# Patient Record
Sex: MALE | Race: WHITE
[De-identification: names, ages, dates, MRNs, and addresses within clinical notes are randomized per-mention and may not be internally consistent; named-entity substitution may affect disease eponyms.]

---

## 2021-04-02 VITALS — SYSTOLIC BLOOD PRESSURE: 119 MMHG | DIASTOLIC BLOOD PRESSURE: 60 MMHG

## 2021-11-01 ENCOUNTER — HOSPITAL ENCOUNTER (EMERGENCY)
Dept: HOSPITAL 61 - ER | Age: 81
Discharge: LEFT BEFORE DISCHARGE | End: 2021-11-01
Payer: MEDICARE

## 2021-11-01 DIAGNOSIS — R53.1: Primary | ICD-10-CM

## 2021-11-01 DIAGNOSIS — Z53.21: ICD-10-CM

## 2021-11-01 DIAGNOSIS — R06.02: ICD-10-CM

## 2021-11-17 VITALS — SYSTOLIC BLOOD PRESSURE: 110 MMHG | DIASTOLIC BLOOD PRESSURE: 63 MMHG

## 2022-01-28 ENCOUNTER — HOSPITAL ENCOUNTER (OUTPATIENT)
Dept: HOSPITAL 61 - NM | Age: 82
End: 2022-01-28
Attending: INTERNAL MEDICINE
Payer: MEDICARE

## 2022-01-28 DIAGNOSIS — I47.2: Primary | ICD-10-CM

## 2022-01-28 PROCEDURE — 93017 CV STRESS TEST TRACING ONLY: CPT

## 2022-01-28 PROCEDURE — 78452 HT MUSCLE IMAGE SPECT MULT: CPT

## 2022-01-28 PROCEDURE — A9500 TC99M SESTAMIBI: HCPCS

## 2022-01-29 NOTE — RAD
MR#: [id number]

Account#: [id number]

Accession#: [id number]

Date of Study: 01/28/2022

Ordering Physician: YAQUELIN MILES, 

Referring Physician: ARAM MORELAND Tech: RT SAVANNA Nelson) (N)





--------------- APPROVED REPORT --------------





Test Type:          Pharmacological

Stress Nurse/Tech: Jimena Barajas RN

Test Indications: Nonsustained V-Tach

Cardiac History: Pacemaker,smoker

Medications:     See Electronic Medical Record

Medical History: See Electronic Medical RecordSee Electronic Medical Record

Resting ECG:     AV Paced

Resting Heart Rate: 78 bpm

Resting Blood Pressure: 105/56mmHg

Pretest Chest Pain: No chest pain



Nurse/Tech Notes

S1,S2 and lungs clear to auscultation.

Consent: The procedure was explained to the patient in lay terms. Informed consent was witnessed. Conrado
eout was entered into [employer] History and Stress Test performed by RT SAVANNA Nelson) (N)



Pharm. Details

Pharmacologic stress testing was performed using 0.4mg per 5ml of regadenoson given intravenously ove
r 7-10 seconds.



Stress Symptoms

No chest pain or symptoms.



POST EXERCISE

Reason for Termination: Infusion complete

Target HR: No

Max HR: 102 bpm

85% of Maximum Predicted HR: 119 bpm

Max Blood Pressure: 109/49mmHg

Blood Pressure response to exercise: Normal blood pressure response during stress.

Heart Rate response to exercise: WNL

Chest Pain: No. 

Arrhythmia: Yes. PVC

ST Change: No. 



INTERPRETATION

Stress EKG Conclusion: Non-diagnostic EKG due to pacing artifact. 



Imaging Protocol

IMAGE PROTOCOL: Rest Tc-99m/stress Tc-99m 1 day



Rest:            Stress:         Viability:   

Radiopharm.Tc99m PrdibelzcJa39k Sestamibi

Dose10.1mCi            31.5mCi            

Duration    15min.           15min.           

Img Date  01/28/2022 01/28/2022      

Inj-Img Hdou03mfm.           60min.           



Rest Admin Site:IV - Left AntecubitalAdministrator:RT SAVANNA Nelson)(N)

Stress Admin Site: IV - Left AntecubitalAdministrator: RT SAVANNA Brewer)(N)



STRESS DATA

End Diast. Vol.87.0mlAv. Heart Rate77.0bpm

End Syst. Vol.16.0mlCO Index BSA0.0L/min

Myocardial Tbqf438.0gEject. Cllukgrv01.0%



Stress Rates

Pk. Fill Rate3.44EDV/secLVtime Pk. Fill 133.79msec

Pk. Empty Rate3.92ESV/secLVtime Pk. Gajmn841.66msec

1/3 Pk. Fill1.68EDV/sec



Stress Scores

Regional WT0.00Summed WT4.00

Regional WM0.00Summed WM0.00



The rest and stress images show normal perfusion, normal contraction and thickening.



LV Perfusion 1

TCD/TID: Yes



LV Perf. Quant

17 Seg. SSS0.00

17 Seg. SRS5.00

17 Seg. SDS0.00

Stress Defect Extent (% LAD)0.00Rest Defect Extent (% LAD)0.00Rev. Defect Extent (% LAD)0.00

Stress Defect Extent (% LCX) 0.00Rest Defect Extent (% LCX)0.00Rev. Defect Extent (% LCX)0.00

Stress Defect Extent (% RCA)0.00Rest Defect Extent (% RCA)23.30Rev. Defect Extent (% RCA)0.00

Stress Defect Extent (% DOMINGO)0.00Rest Defect Extent (% DOMINGO)4.60Rev. Defect Extent (% DOMINGO)0.00



Other Information

Quality:Average

Risk Assessment: Moderate Risk



Conclusion

1. Non-diagnostic EKG due to A-V pacing.

2. Normal perfusion at stress/rest.

3. Normal EF at > 70%

4. Mild transient ischemic dilation at ratio of 1.35, cannot rule out balanced ischemia.

5. Moderate risk study.



Signed by : Yuval Kim, 

Electronically Approved : 01/29/2022 17:03:07